# Patient Record
Sex: MALE | Race: OTHER | HISPANIC OR LATINO | URBAN - METROPOLITAN AREA
[De-identification: names, ages, dates, MRNs, and addresses within clinical notes are randomized per-mention and may not be internally consistent; named-entity substitution may affect disease eponyms.]

---

## 2018-12-04 ENCOUNTER — EMERGENCY (EMERGENCY)
Facility: HOSPITAL | Age: 38
LOS: 1 days | Discharge: HOME | End: 2018-12-04
Attending: EMERGENCY MEDICINE | Admitting: EMERGENCY MEDICINE
Payer: COMMERCIAL

## 2018-12-04 VITALS
SYSTOLIC BLOOD PRESSURE: 116 MMHG | HEART RATE: 89 BPM | RESPIRATION RATE: 16 BRPM | OXYGEN SATURATION: 99 % | DIASTOLIC BLOOD PRESSURE: 77 MMHG | TEMPERATURE: 98 F

## 2018-12-04 VITALS
OXYGEN SATURATION: 98 % | DIASTOLIC BLOOD PRESSURE: 71 MMHG | HEART RATE: 82 BPM | TEMPERATURE: 99 F | SYSTOLIC BLOOD PRESSURE: 127 MMHG | RESPIRATION RATE: 16 BRPM

## 2018-12-04 LAB
ALBUMIN SERPL ELPH-MCNC: 4.2 G/DL — SIGNIFICANT CHANGE UP (ref 3.4–5)
ALP SERPL-CCNC: 60 U/L — SIGNIFICANT CHANGE UP (ref 40–120)
ALT FLD-CCNC: 38 U/L — SIGNIFICANT CHANGE UP (ref 12–42)
ANION GAP SERPL CALC-SCNC: 7 MMOL/L — LOW (ref 9–16)
APPEARANCE UR: CLEAR — SIGNIFICANT CHANGE UP
AST SERPL-CCNC: 21 U/L — SIGNIFICANT CHANGE UP (ref 15–37)
BASOPHILS NFR BLD AUTO: 0.9 % — SIGNIFICANT CHANGE UP (ref 0–2)
BILIRUB SERPL-MCNC: 0.4 MG/DL — SIGNIFICANT CHANGE UP (ref 0.2–1.2)
BILIRUB UR-MCNC: NEGATIVE — SIGNIFICANT CHANGE UP
BUN SERPL-MCNC: 17 MG/DL — SIGNIFICANT CHANGE UP (ref 7–23)
CALCIUM SERPL-MCNC: 8.9 MG/DL — SIGNIFICANT CHANGE UP (ref 8.5–10.5)
CHLORIDE SERPL-SCNC: 105 MMOL/L — SIGNIFICANT CHANGE UP (ref 96–108)
CO2 SERPL-SCNC: 29 MMOL/L — SIGNIFICANT CHANGE UP (ref 22–31)
COLOR SPEC: YELLOW — SIGNIFICANT CHANGE UP
CREAT SERPL-MCNC: 1.17 MG/DL — SIGNIFICANT CHANGE UP (ref 0.5–1.3)
DIFF PNL FLD: ABNORMAL
EOSINOPHIL NFR BLD AUTO: 3 % — SIGNIFICANT CHANGE UP (ref 0–6)
GLUCOSE SERPL-MCNC: 104 MG/DL — HIGH (ref 70–99)
GLUCOSE UR QL: NEGATIVE — SIGNIFICANT CHANGE UP
HCT VFR BLD CALC: 45.1 % — SIGNIFICANT CHANGE UP (ref 39–50)
HGB BLD-MCNC: 15 G/DL — SIGNIFICANT CHANGE UP (ref 13–17)
IMM GRANULOCYTES NFR BLD AUTO: 0.5 % — SIGNIFICANT CHANGE UP (ref 0–1.5)
KETONES UR-MCNC: NEGATIVE — SIGNIFICANT CHANGE UP
LEUKOCYTE ESTERASE UR-ACNC: NEGATIVE — SIGNIFICANT CHANGE UP
LIDOCAIN IGE QN: 119 U/L — SIGNIFICANT CHANGE UP (ref 73–393)
LYMPHOCYTES # BLD AUTO: 20.9 % — SIGNIFICANT CHANGE UP (ref 13–44)
MCHC RBC-ENTMCNC: 26.4 PG — LOW (ref 27–34)
MCHC RBC-ENTMCNC: 33.3 G/DL — SIGNIFICANT CHANGE UP (ref 32–36)
MCV RBC AUTO: 79.3 FL — LOW (ref 80–100)
MONOCYTES NFR BLD AUTO: 6.5 % — SIGNIFICANT CHANGE UP (ref 2–14)
NEUTROPHILS NFR BLD AUTO: 68.2 % — SIGNIFICANT CHANGE UP (ref 43–77)
NITRITE UR-MCNC: NEGATIVE — SIGNIFICANT CHANGE UP
PH UR: 5.5 — SIGNIFICANT CHANGE UP (ref 5–8)
PLATELET # BLD AUTO: 317 K/UL — SIGNIFICANT CHANGE UP (ref 150–400)
POTASSIUM SERPL-MCNC: 3.6 MMOL/L — SIGNIFICANT CHANGE UP (ref 3.5–5.3)
POTASSIUM SERPL-SCNC: 3.6 MMOL/L — SIGNIFICANT CHANGE UP (ref 3.5–5.3)
PROT SERPL-MCNC: 8 G/DL — SIGNIFICANT CHANGE UP (ref 6.4–8.2)
PROT UR-MCNC: NEGATIVE MG/DL — SIGNIFICANT CHANGE UP
RBC # BLD: 5.69 M/UL — SIGNIFICANT CHANGE UP (ref 4.2–5.8)
RBC # FLD: 13.4 % — SIGNIFICANT CHANGE UP (ref 10.3–14.5)
SODIUM SERPL-SCNC: 141 MMOL/L — SIGNIFICANT CHANGE UP (ref 132–145)
SP GR SPEC: >=1.03 — SIGNIFICANT CHANGE UP (ref 1–1.03)
UROBILINOGEN FLD QL: 0.2 E.U./DL — SIGNIFICANT CHANGE UP
WBC # BLD: 7.6 K/UL — SIGNIFICANT CHANGE UP (ref 3.8–10.5)
WBC # FLD AUTO: 7.6 K/UL — SIGNIFICANT CHANGE UP (ref 3.8–10.5)

## 2018-12-04 PROCEDURE — 99284 EMERGENCY DEPT VISIT MOD MDM: CPT

## 2018-12-04 RX ORDER — METOCLOPRAMIDE HCL 10 MG
10 TABLET ORAL ONCE
Qty: 0 | Refills: 0 | Status: COMPLETED | OUTPATIENT
Start: 2018-12-04 | End: 2018-12-04

## 2018-12-04 RX ORDER — MORPHINE SULFATE 50 MG/1
4 CAPSULE, EXTENDED RELEASE ORAL ONCE
Qty: 0 | Refills: 0 | Status: DISCONTINUED | OUTPATIENT
Start: 2018-12-04 | End: 2018-12-04

## 2018-12-04 RX ORDER — IBUPROFEN 200 MG
1 TABLET ORAL
Qty: 30 | Refills: 0 | OUTPATIENT
Start: 2018-12-04 | End: 2018-12-06

## 2018-12-04 RX ORDER — KETOROLAC TROMETHAMINE 30 MG/ML
30 SYRINGE (ML) INJECTION ONCE
Qty: 0 | Refills: 0 | Status: DISCONTINUED | OUTPATIENT
Start: 2018-12-04 | End: 2018-12-04

## 2018-12-04 RX ORDER — ONDANSETRON 8 MG/1
4 TABLET, FILM COATED ORAL ONCE
Qty: 0 | Refills: 0 | Status: COMPLETED | OUTPATIENT
Start: 2018-12-04 | End: 2018-12-04

## 2018-12-04 RX ORDER — TAMSULOSIN HYDROCHLORIDE 0.4 MG/1
1 CAPSULE ORAL
Qty: 14 | Refills: 0 | OUTPATIENT
Start: 2018-12-04 | End: 2018-12-17

## 2018-12-04 RX ORDER — SODIUM CHLORIDE 9 MG/ML
3 INJECTION INTRAMUSCULAR; INTRAVENOUS; SUBCUTANEOUS ONCE
Qty: 0 | Refills: 0 | Status: COMPLETED | OUTPATIENT
Start: 2018-12-04 | End: 2018-12-04

## 2018-12-04 RX ORDER — SODIUM CHLORIDE 9 MG/ML
2000 INJECTION INTRAMUSCULAR; INTRAVENOUS; SUBCUTANEOUS ONCE
Qty: 0 | Refills: 0 | Status: COMPLETED | OUTPATIENT
Start: 2018-12-04 | End: 2018-12-04

## 2018-12-04 RX ORDER — OXYCODONE AND ACETAMINOPHEN 5; 325 MG/1; MG/1
1 TABLET ORAL ONCE
Qty: 0 | Refills: 0 | Status: DISCONTINUED | OUTPATIENT
Start: 2018-12-04 | End: 2018-12-04

## 2018-12-04 RX ORDER — OXYCODONE HYDROCHLORIDE 5 MG/1
1 TABLET ORAL
Qty: 15 | Refills: 0 | OUTPATIENT
Start: 2018-12-04

## 2018-12-04 RX ADMIN — SODIUM CHLORIDE 3 MILLILITER(S): 9 INJECTION INTRAMUSCULAR; INTRAVENOUS; SUBCUTANEOUS at 11:51

## 2018-12-04 RX ADMIN — Medication 104 MILLIGRAM(S): at 12:31

## 2018-12-04 RX ADMIN — ONDANSETRON 4 MILLIGRAM(S): 8 TABLET, FILM COATED ORAL at 11:50

## 2018-12-04 RX ADMIN — OXYCODONE AND ACETAMINOPHEN 1 TABLET(S): 5; 325 TABLET ORAL at 11:51

## 2018-12-04 RX ADMIN — SODIUM CHLORIDE 2000 MILLILITER(S): 9 INJECTION INTRAMUSCULAR; INTRAVENOUS; SUBCUTANEOUS at 11:51

## 2018-12-04 RX ADMIN — Medication 30 MILLIGRAM(S): at 11:50

## 2018-12-04 RX ADMIN — MORPHINE SULFATE 4 MILLIGRAM(S): 50 CAPSULE, EXTENDED RELEASE ORAL at 12:31

## 2018-12-04 NOTE — ED PROVIDER NOTE - OBJECTIVE STATEMENT
38 y o male with PMHX of kidney stones (2 stones - last one passed 2 years ago) and asthma presents to ED with left lower abdominal pain while going to work today. States that pain worsened when urinating, but different from past kidney stones. 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 Pain has decreased since, but still present. Denies burning urination, flank pain, fevers, chills, diarrhea, or other sx. 38 y o male with PMHX of kidney stones (2 stones - last one passed 2 years ago) and asthma presents to ED with left lower abdominal pain while going to work today. States that pain started abruptly worsened when urinating, but different from past kidney stones (no back pain today). 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 Pain has decreased since, but still present. Denies burning urination, flank pain, fevers, chills, diarrhea, or other sx.

## 2018-12-04 NOTE — ED PROVIDER NOTE - MUSCULOSKELETAL, MLM
Spine appears normal, range of motion is not limited, no muscle or joint tenderness. No CVA tenderness, bilaterally. Yes

## 2018-12-04 NOTE — ED PROVIDER NOTE - PROGRESS NOTE DETAILS
Labs unremarkable. Urine with moderate blood, consistent with kidney stone. Patient feels improved, wants to go home, tolerating PO, will discharge with strict return precautions, follow up with urology. Will rx Percocet and Flomax. Labs unremarkable. Urine with moderate blood, consistent with kidney stone. Patient feels improved, wants to go home, tolerating PO, will discharge with strict return precautions, follow up with urology. Will rx Percocet and Flomax. Instructed to proceed to nearest ED should sxs persist or worsen, given strainer for urine

## 2018-12-04 NOTE — ED PROVIDER NOTE - CONSTITUTIONAL, MLM
normal... Well appearing, well nourished, awake, alert, oriented to person, place, time/situation and uncomfortable.

## 2018-12-08 DIAGNOSIS — Z91.041 RADIOGRAPHIC DYE ALLERGY STATUS: ICD-10-CM

## 2018-12-08 DIAGNOSIS — N20.0 CALCULUS OF KIDNEY: ICD-10-CM

## 2018-12-08 DIAGNOSIS — R10.32 LEFT LOWER QUADRANT PAIN: ICD-10-CM

## 2018-12-08 DIAGNOSIS — Z91.018 ALLERGY TO OTHER FOODS: ICD-10-CM

## 2019-09-09 ENCOUNTER — HOSPITAL ENCOUNTER (EMERGENCY)
Dept: HOSPITAL 74 - JER | Age: 39
Discharge: HOME | End: 2019-09-09
Payer: COMMERCIAL

## 2019-09-09 VITALS — SYSTOLIC BLOOD PRESSURE: 126 MMHG | DIASTOLIC BLOOD PRESSURE: 82 MMHG

## 2019-09-09 VITALS — TEMPERATURE: 98.2 F | HEART RATE: 77 BPM

## 2019-09-09 VITALS — BODY MASS INDEX: 28.7 KG/M2

## 2019-09-09 DIAGNOSIS — N13.2: Primary | ICD-10-CM

## 2019-09-09 DIAGNOSIS — Z87.442: ICD-10-CM

## 2019-09-09 LAB
ALBUMIN SERPL-MCNC: 4 G/DL (ref 3.4–5)
ALP SERPL-CCNC: 55 U/L (ref 45–117)
ALT SERPL-CCNC: 27 U/L (ref 13–61)
ANION GAP SERPL CALC-SCNC: 10 MMOL/L (ref 8–16)
APPEARANCE UR: (no result)
AST SERPL-CCNC: 19 U/L (ref 15–37)
BACTERIA # UR AUTO: 2.2 /HPF
BASOPHILS # BLD: 0.4 % (ref 0–2)
BILIRUB SERPL-MCNC: 0.3 MG/DL (ref 0.2–1)
BILIRUB UR STRIP.AUTO-MCNC: NEGATIVE MG/DL
BUN SERPL-MCNC: 15.1 MG/DL (ref 7–18)
CALCIUM SERPL-MCNC: 8.7 MG/DL (ref 8.5–10.1)
CASTS URNS QL MICRO: 7 /LPF (ref 0–8)
CHLORIDE SERPL-SCNC: 108 MMOL/L (ref 98–107)
CO2 SERPL-SCNC: 25 MMOL/L (ref 21–32)
COLOR UR: YELLOW
CREAT SERPL-MCNC: 1.2 MG/DL (ref 0.55–1.3)
DEPRECATED RDW RBC AUTO: 14.3 % (ref 11.9–15.9)
EOSINOPHIL # BLD: 0 % (ref 0–4.5)
EPITH CASTS URNS QL MICRO: 0.8 /HPF
GLUCOSE SERPL-MCNC: 105 MG/DL (ref 74–106)
HCT VFR BLD CALC: 42.3 % (ref 35.4–49)
HGB BLD-MCNC: 13.7 GM/DL (ref 11.7–16.9)
KETONES UR QL STRIP: (no result)
LEUKOCYTE ESTERASE UR QL STRIP.AUTO: NEGATIVE
LYMPHOCYTES # BLD: 4.7 % (ref 8–40)
MCH RBC QN AUTO: 26.2 PG (ref 25.7–33.7)
MCHC RBC AUTO-ENTMCNC: 32.4 G/DL (ref 32–35.9)
MCV RBC: 80.9 FL (ref 80–96)
MONOCYTES # BLD AUTO: 1.7 % (ref 3.8–10.2)
NEUTROPHILS # BLD: 93.2 % (ref 42.8–82.8)
NITRITE UR QL STRIP: NEGATIVE
PH UR: 5 [PH] (ref 5–8)
PLATELET # BLD AUTO: 296 K/MM3 (ref 134–434)
PLATELET BLD QL SMEAR: ADEQUATE
PMV BLD: 8.1 FL (ref 7.5–11.1)
POTASSIUM SERPLBLD-SCNC: 4.1 MMOL/L (ref 3.5–5.1)
PROT SERPL-MCNC: 7.3 G/DL (ref 6.4–8.2)
PROT UR QL STRIP: NEGATIVE
PROT UR QL STRIP: NEGATIVE
RBC # BLD AUTO: 19 /HPF (ref 0–4)
RBC # BLD AUTO: 5.22 M/MM3 (ref 4–5.6)
SODIUM SERPL-SCNC: 144 MMOL/L (ref 136–145)
SP GR UR: 1.03 (ref 1.01–1.03)
UROBILINOGEN UR STRIP-MCNC: 0.2 MG/DL (ref 0.2–1)
WBC # BLD AUTO: 11.5 K/MM3 (ref 4–10)
WBC # UR AUTO: 1 /HPF (ref 0–5)

## 2019-09-09 PROCEDURE — 3E0233Z INTRODUCTION OF ANTI-INFLAMMATORY INTO MUSCLE, PERCUTANEOUS APPROACH: ICD-10-PCS

## 2019-09-09 PROCEDURE — 3E033GC INTRODUCTION OF OTHER THERAPEUTIC SUBSTANCE INTO PERIPHERAL VEIN, PERCUTANEOUS APPROACH: ICD-10-PCS

## 2019-09-09 NOTE — PDOC
History of Present Illness





- General


Chief Complaint: Pain, Acute


Stated Complaint: KIDNEY STONES


History Source: Patient





- History of Present Illness


Initial Comments: 





19 16:57


40 y/o/m here for right flank pain that started at 1100 today. He states the 

pain radiates from his right flank to his groin. He has a history of kidney 

stones and his pain feels the same as his past kidney stones. He feels nauseous 

and has vomited multiple times, no blood in his vomit. He had Oxycodone at home 

which was , he took 2 pills but vomited within 20 mins of taking it. He 

states he has been sweating a lot today as well and almost fainted once due to 

the pain. He denies any chest pain, SOB, fever, cough, or other symptoms.  





PMHx: Asthma


SHx: denies


Social: denies tobacco and alcohol use





Past History





- Past Medical History


Allergies/Adverse Reactions: 


 Allergies











Allergy/AdvReac Type Severity Reaction Status Date / Time


 


cashew nut Allergy   Verified 19 15:47


 


peanut Allergy   Verified 19 15:47











Home Medications: 


Ambulatory Orders





Albuterol Sulfate Inhaler - [Ventolin Hfa Inhaler -] 2 puff IH DAILY 19 


Fluticasone/Vilanterol [Breo Ellipta 100-25 Mcg INH] 1 each IH DAILY 19 


Ibuprofen [Motrin -] 600 mg PO TID PRN #14 tablet 19 


Ondansetron HCl [Zofran] 4 mg PO TID PRN #15 tablet 19 








Asthma: Yes


COPD: No


Kidney Stones: Yes





- Suicide/Smoking/Psychosocial Hx


Smoking History: Never smoked


Have you smoked in the past 12 months: No


Information on smoking cessation initiated: No


Hx Alcohol Use: No


Drug/Substance Use Hx: No





**Review of Systems





- Review of Systems


Constitutional: Yes: Chills.  No: Fever


HEENTM: No: Nose Congestion


Respiratory: No: Cough, Shortness of Breath


Cardiac (ROS): No: Chest Pain, Lightheadedness


ABD/GI: Yes: Nausea, Vomiting.  No: Constipated, Diarrhea


Musculoskeletal: Yes: Back Pain


Integumentary: No: Rash


Neurological: No: Headache


Endocrine: Yes: Excessive Sweating





*Physical Exam





- Vital Signs


 Last Vital Signs











Temp Pulse Resp BP Pulse Ox


 


 98.2 F   77   22 H  130/89   98 


 


 19 15:41  19 15:41  19 15:41  19 15:41  19 15:41














- Physical Exam


General Appearance: Yes: Severe Distress


HEENT: positive: EOMI


Neck: positive: Supple.  negative: Tender


Respiratory/Chest: positive: Normal Breath Sounds.  negative: Accessory Muscle 

Use, Labored Respiration


Cardiovascular: positive: Regular Rhythm, Regular Rate, S1, S2


Gastrointestinal/Abdominal: positive: Normal Bowel Sounds, Tender (Tenderness 

to palpation over right flank and right groin ), Guarding


Musculoskeletal: positive: CVA Tenderness (R).  negative: CVA Tenderness (L)


Extremity: positive: Normal Capillary Refill


Integumentary: positive: Diaphoresis


Neurologic: positive: Fully Oriented, Alert, Motor Strength 





ED Treatment Course





- LABORATORY


CBC & Chemistry Diagram: 


 19 16:26





 19 16:26





- RADIOLOGY


Radiology Studies Ordered: 














 Category Date Time Status


 


 SPIRAL- RENAL-STONE CT [CT] Stat CT Scan  19 16:10 Ordered














- Medications


Given in the ED: 


ED Medications














Discontinued Medications














Generic Name Dose Route Start Last Admin





  Trade Name Freq  PRN Reason Stop Dose Admin


 


Ketorolac Tromethamine  30 mg  19 16:07  19 16:30





  Toradol Injection -  IM  19 16:08  30 mg





  ONCE ONE   Administration





     





     





     





     














Medical Decision Making





- Medical Decision Making





19 18:20


40 y/o/m here for right flank pain that started at 1100 today. He states the 

pain radiates from his right flank to his groin. He has a history of kidney 

stones and his pain feels the same as his past kidney stones. 


Patient was given a bolus of fluids and Toradol with significant improvement in 

pain.


CT scan was positive for a 2-3mm stone at the level of the lower pelvis with 

mild hydronephrosis and diffuse hepatic steatosis. 


Patient's pain was much improved but complained of nausea. 


Patient was given Zofran with improvement of nausea. 


UA positive for 3+ blood. CBC shows mild WBC elevation. 


Patient discharged with Zofran and Motrin prescriptions and recommendations to 

follow up with his PCP and Urologist. 











*DC/Admit/Observation/Transfer


Diagnosis at time of Disposition: 


 Kidney stone








- Discharge Dispostion


Disposition: HOME


Condition at time of disposition: Good


Decision to Admit order: No





- Prescriptions


Prescriptions: 


Ibuprofen [Motrin -] 600 mg PO TID PRN #14 tablet


 PRN Reason: Pain


Ondansetron HCl [Zofran] 4 mg PO TID PRN #15 tablet


 PRN Reason: Nausea





- Referrals





- Patient Instructions


Printed Discharge Instructions:  DI for Kidney Stones


Additional Instructions: 


If you worsening pain, nausea, difficulty urinating, or other concerns return 

to the ER.


Please follow up with your primary care doctor and urologist in the next week. 





- Post Discharge Activity

## 2019-09-09 NOTE — PDOC
Attending Attestation





- Resident


Resident Name: Celestino Virk





- ED Attending Attestation


I have performed the following: I have examined & evaluated the patient, The 

case was reviewed & discussed with the resident, I agree w/resident's findings 

& plan, Exceptions are as noted





- HPI


HPI: 





09/09/19 16:53


39-year-old male presents with left flank pain that started 11 AM today,this 

pain  is accompanied with nausea and vomiting.  The pain radiated to his left 

groin





- Physicial Exam


PE: 





09/09/19 16:53


wnwd 38 yo male w L flank pain


head ncat


neck supple


lungs cta b/l


cvs [id number]


left flank pain


abd nontender,no rebound,+BS


skin warm and dry


extremities  no edema,no clubbing,no erythema


neuro axox3,ambulatory





- Medical Decision Making





09/09/19 16:56


ct scan spiral pending


UA sent


cbc,comp





pmh  kidney stones


pt denies fever,chills,chest pain ,shortness of breath


toradol for pain


09/09/19 17:32





normal bun and creatinine 


UA +3 rbcs


ct scan abd/pel:  +  UVJ  stone 2-3mm,mild hydronephrosis,


09/09/19 18:26


pt's symptoms resolved


he will followup with his physician in [place]

## 2023-11-02 NOTE — ED PROVIDER NOTE - CARE PLAN
Already sent message for 1 year follow up.  Also needs CTA head and neck at least a couple of days prior to that visit as well.  Thanks!  Follow up 1 year with Dr. Joshi with aorta iliac ultrasound prior.  Order is in.  Ectatic common iliac arteries and LLE varicose veins. Principal Discharge DX:	Kidney stones